# Patient Record
Sex: MALE | Race: WHITE | NOT HISPANIC OR LATINO | Employment: OTHER | URBAN - METROPOLITAN AREA
[De-identification: names, ages, dates, MRNs, and addresses within clinical notes are randomized per-mention and may not be internally consistent; named-entity substitution may affect disease eponyms.]

---

## 2017-12-08 ENCOUNTER — LAB REQUISITION (OUTPATIENT)
Dept: LAB | Facility: HOSPITAL | Age: 57
End: 2017-12-08

## 2017-12-08 DIAGNOSIS — Z86.010 HISTORY OF COLONIC POLYPS: ICD-10-CM

## 2017-12-08 PROCEDURE — 88305 TISSUE EXAM BY PATHOLOGIST: CPT | Performed by: INTERNAL MEDICINE

## 2017-12-11 LAB
CYTO UR: NORMAL
LAB AP CASE REPORT: NORMAL
LAB AP CLINICAL INFORMATION: NORMAL
Lab: NORMAL
PATH REPORT.FINAL DX SPEC: NORMAL
PATH REPORT.GROSS SPEC: NORMAL

## 2018-12-04 ENCOUNTER — OFFICE VISIT (OUTPATIENT)
Dept: ORTHOPEDIC SURGERY | Facility: CLINIC | Age: 58
End: 2018-12-04

## 2018-12-04 VITALS — OXYGEN SATURATION: 97 % | HEART RATE: 82 BPM | HEIGHT: 70 IN | WEIGHT: 224.87 LBS | BODY MASS INDEX: 32.19 KG/M2

## 2018-12-04 DIAGNOSIS — S83.242A OTHER TEAR OF MEDIAL MENISCUS OF LEFT KNEE, UNSPECIFIED WHETHER OLD OR CURRENT TEAR, INITIAL ENCOUNTER: ICD-10-CM

## 2018-12-04 DIAGNOSIS — M25.562 LEFT KNEE PAIN, UNSPECIFIED CHRONICITY: Primary | ICD-10-CM
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
## 2018-12-04 DIAGNOSIS — M17.12 PRIMARY OSTEOARTHRITIS OF LEFT KNEE: ICD-10-CM

## 2018-12-04 PROCEDURE — 99203 OFFICE O/P NEW LOW 30 MIN: CPT | Performed by: ORTHOPAEDIC SURGERY

## 2018-12-04 RX ORDER — ASPIRIN 81 MG/1
81 TABLET ORAL DAILY
COMMUNITY

## 2018-12-04 RX ORDER — LISINOPRIL 40 MG/1
40 TABLET ORAL DAILY
COMMUNITY

## 2018-12-04 RX ORDER — HYDROCHLOROTHIAZIDE 25 MG/1
TABLET ORAL
COMMUNITY
Start: 2018-11-09

## 2018-12-04 RX ORDER — SITAGLIPTIN AND METFORMIN HYDROCHLORIDE 1000; 50 MG/1; MG/1
TABLET, FILM COATED, EXTENDED RELEASE ORAL
COMMUNITY
Start: 2018-11-20

## 2018-12-04 RX ORDER — AMLODIPINE BESYLATE 10 MG/1
TABLET ORAL
COMMUNITY
Start: 2018-11-21

## 2018-12-04 RX ORDER — OMEPRAZOLE 20 MG/1
20 CAPSULE, DELAYED RELEASE ORAL DAILY
COMMUNITY

## 2018-12-04 NOTE — PROGRESS NOTES
[facility] [place] [facility] [place]    Subjective     Pain of the Left Knee (Left knee pain for about 4 years/Pain is at a 6 today/Tylenol, ibuprofen, heat, ice nothing has helped much. Had arthroscopic surgery in 2015.)      HPI    Manny Corey is a 58 y.o. male.  Patient's here today for a second opinion regarding his left knee.  He was injured at work about 4 years ago.  He has been seeing my former partner Dr. Celestin on and off over the years.  He has had several MRIs on the knee (2?)  And arthroscopic surgery.  Patient has been offered injections in the past by Dr. Celestin and he has declined because he does not want a temporary fix.  He tells me that the knee bothers him all the time.  It used to taken 35 minutes to mow his lawn and now takes him 2-1/2 hours because he has to stop and rest.  He had to retire from his job because he cannot stand for prolonged periods of time on his left knee.  He rates the pain as severe.  He has tried Tylenol and ibuprofen without a lot of relief.     Past Medical History:   Diagnosis Date   • Acid reflux    • Diabetes (CMS/HCC)    • Hypertension       Past Surgical History:   Procedure Laterality Date   • CATARACT EXTRACTION WITH INTRAOCULAR LENS IMPLANT     • COLONOSCOPY     • KNEE SURGERY Left 2015    arthroscopy (Dr. Celestin)   • TONSILLECTOMY        Family History   Problem Relation Age of Onset   • Cancer Mother    • No Known Problems Father      Social History     Socioeconomic History   • Marital status: Unknown     Spouse name: Not on file   • Number of children: Not on file   • Years of education: Not on file   • Highest education level: Not on file   Social Needs   • Financial resource strain: Not on file   • Food insecurity - worry: Not on file   • Food insecurity - inability: Not on file   • Transportation needs - medical: Not on file   • Transportation needs - non-medical: Not on file   Occupational History   • Not on file   Tobacco Use   • Smoking status:  "Never Smoker   • Smokeless tobacco: Never Used   Substance and Sexual Activity   • Alcohol use: No     Frequency: Never   • Drug use: No   • Sexual activity: Defer   Other Topics Concern   • Not on file   Social History Narrative   • Not on file      Current Outpatient Medications on File Prior to Visit   Medication Sig Dispense Refill   • amLODIPine (NORVASC) 10 MG tablet      • aspirin 81 MG EC tablet Take 81 mg by mouth Daily.     • hydrochlorothiazide (HYDRODIURIL) 25 MG tablet      • Insulin Glargine (TOUJEO MAX SOLOSTAR) 300 UNIT/ML solution pen-injector Inject  under the skin into the appropriate area as directed.     • JANUMET XR  MG tablet      • lisinopril (PRINIVIL,ZESTRIL) 40 MG tablet Take 40 mg by mouth Daily.     • omeprazole (priLOSEC) 20 MG capsule Take 20 mg by mouth Daily.       No current facility-administered medications on file prior to visit.       No Known Allergies     The following portions of the patient's history were reviewed and updated as appropriate: allergies, current medications, past family history, past medical history, past social history, past surgical history and problem list.    Review of Systems   Constitutional: Negative.    HENT: Positive for dental problem.    Eyes: Negative.    Respiratory: Negative.    Cardiovascular: Negative.    Gastrointestinal: Negative.    Endocrine: Negative.    Genitourinary: Negative.    Musculoskeletal: Positive for arthralgias.   Skin: Negative.    Allergic/Immunologic: Negative.    Neurological: Negative.    Hematological: Negative.    Psychiatric/Behavioral: Negative.         Objective      Physical Exam  Pulse 82   Ht 177.2 cm (69.75\")   Wt 102 kg (224 lb 13.9 oz)   SpO2 97%   BMI 32.50 kg/m²     Body mass index is 32.5 kg/m².    General  Mental Status - alert  General Appearance - cooperative, well groomed, not in acute distress  Orientation - Oriented X3  Build & Nutrition - well developed and well nourished  Posture - normal " posture  Gait - normal gait     Integumentary  Global Assessment  Examination of related systems reveals - no lymphadenopathy  Ears:  No abnormality  Nose:  No mucous drainage  General Characteristics  Overall examination of the patient's skin reveals - no rashes, no evidence of scars, no suspicious lesions and no bruises.  Color - normal coloration of skin.  Vascular: Brisk capillary refill in all extremities    Ortho Exam    Musculoskeletal:  Global Assessment:  Overall assessment of Lower Extremity Muscle Strength and Tone:  Left quadriceps--5/5   Left hamstrings--5/5       Left tibialis anterior--5/5  Left gastroc-soleus--5/5  Left EHL--5/5      Inspection and Palpation:    Left knee:  Tenderness:  Diffuse but maximal over the medial joint line  Effusion:  minimal  Crepitus:  none  Pulses:  2+  Ecchymosis:  None  Warmth:  None       ROM:  Left:  Extension: 5     Flexion: 120    Instability:    Left:  Lachman Test:  Negative, Varus stress test negative, Valgus stress test negative   Anterior Drawer Test:  Negative, Posterior Drawer Test:  Negative    Functional Testing:    Left:  Skyler's test:  Positive  Patella grind test:  Negative  Q-angle:  Normal  Apprehension Sign:  Negative    Imaging/Studies  Imaging Results (last 24 hours)     Procedure Component Value Units Date/Time    XR Knee 3+ View With University Left [[id number]] Resulted:  12/04/18 0927     Updated:  12/04/18 0928    Narrative:       Knee X-Ray    Indication: Pain    Study:  Upright AP, Lateral, and Sunrise views of Left knee    Comparison: none     Findings:  No acute fractures are visualized  No bony lesions are present  Patient appears to have chondrocalcinosis in the medial and lateral   menisci.  This is visible on the AP and lateral x-ray.  Mild diffuse tricompartmental joint space narrowing    Impression: See above for full discussion                Assessment:  1. Left knee pain, unspecified chronicity    2. Work related injury    3.  Primary osteoarthritis of left knee    4. Other tear of medial meniscus of left knee, unspecified whether old or current tear, initial encounter        Plan:  1. Continue over-the-counter medication as needed for discomfort  2. I think an MRI of the patient's left knee would be beneficial to see if there are any other procedures that could be of benefit to him such as repeat arthroscopy.  3. I have recommended that the patient undergo steroid plus or minus Visco supplement injections which I think could help his symptoms overall.  He will consider that option but is unlikely to proceed with that.  4. Follow-up will be left open-ended.      Medical Decision Making  Management Options : over-the-counter medicine  Data/Risk: radiology tests and independent visualization of imaging, lab tests, or EMG/NCV    Naun Meeks MD  12/04/18  9:34 AM

## 2020-12-10 RX ORDER — SODIUM, POTASSIUM,MAG SULFATES 17.5-3.13G
2 SOLUTION, RECONSTITUTED, ORAL ORAL TAKE AS DIRECTED
Qty: 354 ML | Refills: 0 | Status: SHIPPED | OUTPATIENT
Start: 2020-12-10

## 2020-12-14 ENCOUNTER — APPOINTMENT (OUTPATIENT)
Dept: PREADMISSION TESTING | Facility: HOSPITAL | Age: 60
End: 2020-12-14

## 2020-12-14 LAB — SARS-COV-2 RNA RESP QL NAA+PROBE: NOT DETECTED

## 2020-12-14 PROCEDURE — U0004 COV-19 TEST NON-CDC HGH THRU: HCPCS

## 2020-12-14 PROCEDURE — C9803 HOPD COVID-19 SPEC COLLECT: HCPCS

## 2020-12-17 ENCOUNTER — OUTSIDE FACILITY SERVICE (OUTPATIENT)
Dept: GASTROENTEROLOGY | Facility: CLINIC | Age: 60
End: 2020-12-17

## 2020-12-17 PROCEDURE — 45378 DIAGNOSTIC COLONOSCOPY: CPT | Performed by: INTERNAL MEDICINE
